# Patient Record
Sex: FEMALE | Race: WHITE | NOT HISPANIC OR LATINO | Employment: UNEMPLOYED | URBAN - METROPOLITAN AREA
[De-identification: names, ages, dates, MRNs, and addresses within clinical notes are randomized per-mention and may not be internally consistent; named-entity substitution may affect disease eponyms.]

---

## 2022-01-11 ENCOUNTER — OFFICE VISIT (OUTPATIENT)
Dept: PSYCHIATRY | Facility: CLINIC | Age: 27
End: 2022-01-11
Payer: MEDICAID

## 2022-01-11 ENCOUNTER — PATIENT MESSAGE (OUTPATIENT)
Dept: PSYCHIATRY | Facility: CLINIC | Age: 27
End: 2022-01-11

## 2022-01-11 DIAGNOSIS — Z62.9: Primary | ICD-10-CM

## 2022-01-11 PROCEDURE — 90887 INTERPJ/EXPLNAJ RSLT PSYC XM: CPT | Mod: 95,,, | Performed by: STUDENT IN AN ORGANIZED HEALTH CARE EDUCATION/TRAINING PROGRAM

## 2022-01-11 PROCEDURE — 90887 PR CONSULTATION WITH FAMILY: ICD-10-PCS | Mod: 95,,, | Performed by: STUDENT IN AN ORGANIZED HEALTH CARE EDUCATION/TRAINING PROGRAM

## 2022-01-11 SDOH — SOCIAL DETERMINANTS OF HEALTH (SDOH): PROBLEM RELATED TO UPBRINGING, UNSPECIFIED: Z62.9

## 2022-01-11 NOTE — PROGRESS NOTES
[facility]  Psychology  Progress Note  Individual Psychotherapy (PhD/LCSW)    Patient Name: Boby Gamino  MRN: [id number]    Patient [facility]: [facility]  Primary Care Provider: Primary Doctor No    Psychiatry Visit (PhD/LCSW)  Consultation with Family - CPT 30404    Date: 1/11/2022    Site: Telemed    The patient location is: Patient's home/ Patient reported that his/her location at the time of this visit was in the [facility]     Visit type: Virtual visit with synchronous audio and video     Each patient to whom he or she provides medical services by telemedicine is: (1) informed of the relationship between the physician and patient and the respective role of any other health care provider with respect to management of the patient; and (2) notified that he or she may decline to receive medical services by telemedicine and may withdraw from such care at any time.     Summary: Met with patient's mother to further discuss parenting skills training for her son. The son has shown improvement, as evidenced by his behavior during this virtual session. The son allowed the session to progress without interruption. The mother reported that the patient is not as disruptive, listens to directions better, and is not as destructive as he has been in the past. The mother stated that she wants her son to return to school but is running into red tape with the school system. The mother was encouraged to hand deliver the reports to the school board and the DA's office so that her son can go back to school and socialize with peers. I am scheduled to meet with her son next Thursday.     Diagnostic Impression - Plan:       ICD-10-CM ICD-9-CM   1. Ineffective parenting skills  Z62.9 V61.20       Plan:individual psychotherapy    Return to Clinic: as scheduled    Length of Service (minutes): 45          Tessie Sutton, PhD  Psychologist  [facility]

## 2022-05-10 ENCOUNTER — TELEPHONE (OUTPATIENT)
Dept: PSYCHIATRY | Facility: CLINIC | Age: 27
End: 2022-05-10
Payer: MEDICAID

## 2022-05-12 ENCOUNTER — OFFICE VISIT (OUTPATIENT)
Dept: PSYCHIATRY | Facility: CLINIC | Age: 27
End: 2022-05-12
Payer: MEDICAID

## 2022-05-12 DIAGNOSIS — Z62.9: Primary | ICD-10-CM

## 2022-05-12 PROCEDURE — 90887 INTERPJ/EXPLNAJ RSLT PSYC XM: CPT | Mod: 95,,, | Performed by: STUDENT IN AN ORGANIZED HEALTH CARE EDUCATION/TRAINING PROGRAM

## 2022-05-12 PROCEDURE — 1159F MED LIST DOCD IN RCRD: CPT | Mod: CPTII,95,, | Performed by: STUDENT IN AN ORGANIZED HEALTH CARE EDUCATION/TRAINING PROGRAM

## 2022-05-12 PROCEDURE — 90887 PR CONSULTATION WITH FAMILY: ICD-10-PCS | Mod: 95,,, | Performed by: STUDENT IN AN ORGANIZED HEALTH CARE EDUCATION/TRAINING PROGRAM

## 2022-05-12 PROCEDURE — 1159F PR MEDICATION LIST DOCUMENTED IN MEDICAL RECORD: ICD-10-PCS | Mod: CPTII,95,, | Performed by: STUDENT IN AN ORGANIZED HEALTH CARE EDUCATION/TRAINING PROGRAM

## 2022-05-12 SDOH — SOCIAL DETERMINANTS OF HEALTH (SDOH): PROBLEM RELATED TO UPBRINGING, UNSPECIFIED: Z62.9

## 2022-05-12 NOTE — PROGRESS NOTES
"[facility]  Psychology  Progress Note  Individual Psychotherapy (PhD/LCSW)    Patient Name: Boby Gamino  MRN: [id number]    Patient [facility]: [facility]  Primary Care Provider: Primary Doctor No    Psychiatry Visit (PhD/LCSW)  Consultation with Family - CPT 28979    Date: 5/12/2022    Site: Telemed    The patient location is: Patient's home/ Patient reported that his/her location at the time of this visit was in the [facility]     Visit type: Virtual visit with synchronous audio and video     Each patient to whom he or she provides medical services by telemedicine is: (1) informed of the relationship between the physician and patient and the respective role of any other health care provider with respect to management of the patient; and (2) notified that he or she may decline to receive medical services by telemedicine and may withdraw from such care at any time.     Summary: Met with patient's mother to further discuss parenting skills training for her son. We discussed ways to reframe her thoughts about her son's behavior. She was reminded to praise her son when he is acting appropriately as well as set boundaries and consequences when he does not follow rules. The mother is ready for her son to return to school after a 3 year hiatus but needs a letter from Dr. Virk prior to him being allowed to return. The patient's mother was reminded to not talk about the patient in front of him, especially when the discussion is negative. She was encouraged to "speak life" into her son. For example, when the family is about to go to a restaurant, highlight her son's strengths instead of listing all of the bad things that he might get into while he is there. (ex. Saying things like "You are so responsible and are such a good role model for other children. I know that you will show us how mature you are when we go to the restaurant.")This will encourage her son to engage in the positive " behaviors that she spoke of rather than the negative. The patient plans to share these tips with her  and we will meet again soon.     Diagnostic Impression - Plan:       ICD-10-CM ICD-9-CM   1. Ineffective parenting skills  Z62.9 V61.20       Plan:individual psychotherapy    Return to Clinic: as scheduled    Length of Service (minutes): 45          Tessie Sutton, PhD  Psychologist  [facility]

## 2022-05-26 ENCOUNTER — OFFICE VISIT (OUTPATIENT)
Dept: PSYCHIATRY | Facility: CLINIC | Age: 27
End: 2022-05-26
Payer: MEDICAID

## 2022-05-26 DIAGNOSIS — Z62.9: Primary | ICD-10-CM

## 2022-05-26 PROCEDURE — 90887 PR CONSULTATION WITH FAMILY: ICD-10-PCS | Mod: 95,,, | Performed by: STUDENT IN AN ORGANIZED HEALTH CARE EDUCATION/TRAINING PROGRAM

## 2022-05-26 PROCEDURE — 90887 INTERPJ/EXPLNAJ RSLT PSYC XM: CPT | Mod: 95,,, | Performed by: STUDENT IN AN ORGANIZED HEALTH CARE EDUCATION/TRAINING PROGRAM

## 2022-05-26 PROCEDURE — 1159F PR MEDICATION LIST DOCUMENTED IN MEDICAL RECORD: ICD-10-PCS | Mod: CPTII,95,, | Performed by: STUDENT IN AN ORGANIZED HEALTH CARE EDUCATION/TRAINING PROGRAM

## 2022-05-26 PROCEDURE — 1159F MED LIST DOCD IN RCRD: CPT | Mod: CPTII,95,, | Performed by: STUDENT IN AN ORGANIZED HEALTH CARE EDUCATION/TRAINING PROGRAM

## 2022-05-26 SDOH — SOCIAL DETERMINANTS OF HEALTH (SDOH): PROBLEM RELATED TO UPBRINGING, UNSPECIFIED: Z62.9

## 2022-05-28 NOTE — PROGRESS NOTES
[facility]  Psychology  Progress Note  Individual Psychotherapy (PhD/LCSW)    Patient Name: Boby Gamino  MRN: [id number]    Patient [facility]: [facility]  Primary Care Provider: Primary Doctor No    Psychiatry Visit (PhD/LCSW)  Consultation with Family - CPT 27212    Date: 5/26/2022    Site: Telemed    The patient location is: Patient's home/ Patient reported that his/her location at the time of this visit was in the [facility]     Visit type: Virtual visit with synchronous audio and video     Each patient to whom he or she provides medical services by telemedicine is: (1) informed of the relationship between the physician and patient and the respective role of any other health care provider with respect to management of the patient; and (2) notified that he or she may decline to receive medical services by telemedicine and may withdraw from such care at any time.     Summary: Patient's mother reported that there has been an improvement in the patient's behavior. She notices that his behavior is a problem when he is not receiving attention. We discussed how behavior is often a symptom of an unmet need. She also admitted that the patient is likely bored being at home, which is way the mother is ready for her son to return to in-person school. She reported that the court has to approve his return before he can actually be within a classroom. This determination has to be recommended by his psychiatrist. The mother was encouraged to view his behavior not as strictly defiance but as a message to the parents that a need is not being met.     Diagnostic Impression - Plan:       ICD-10-CM ICD-9-CM   1. Ineffective parenting skills  Z62.9 V61.20       Plan:individual psychotherapy    Return to Clinic: as scheduled    Length of Service (minutes): 45          Tessie Sutton, PhD  Psychologist  [facility]

## 2022-06-07 ENCOUNTER — TELEPHONE (OUTPATIENT)
Dept: PSYCHIATRY | Facility: CLINIC | Age: 27
End: 2022-06-07
Payer: MEDICAID

## 2022-06-09 ENCOUNTER — PATIENT MESSAGE (OUTPATIENT)
Dept: PSYCHIATRY | Facility: CLINIC | Age: 27
End: 2022-06-09

## 2022-10-06 ENCOUNTER — TELEPHONE (OUTPATIENT)
Dept: PSYCHIATRY | Facility: CLINIC | Age: 27
End: 2022-10-06
Payer: MEDICAID

## 2022-10-06 NOTE — TELEPHONE ENCOUNTER
----- Message from Dinora Rubin sent at 10/6/2022  2:02 PM CDT -----  Regarding: Voicemail  Wants a call back